# Patient Record
Sex: MALE | Race: WHITE | Employment: FULL TIME | URBAN - METROPOLITAN AREA
[De-identification: names, ages, dates, MRNs, and addresses within clinical notes are randomized per-mention and may not be internally consistent; named-entity substitution may affect disease eponyms.]

---

## 2019-06-04 ENCOUNTER — THERAPY VISIT (OUTPATIENT)
Dept: OCCUPATIONAL THERAPY | Facility: CLINIC | Age: 54
End: 2019-06-04
Payer: COMMERCIAL

## 2019-06-04 DIAGNOSIS — M25.642 STIFFNESS OF FINGER JOINT OF LEFT HAND: ICD-10-CM

## 2019-06-04 DIAGNOSIS — M20.012 MALLET DEFORMITY OF LEFT LITTLE FINGER: ICD-10-CM

## 2019-06-04 PROCEDURE — 97035 APP MDLTY 1+ULTRASOUND EA 15: CPT | Mod: GO | Performed by: OCCUPATIONAL THERAPIST

## 2019-06-04 PROCEDURE — 97165 OT EVAL LOW COMPLEX 30 MIN: CPT | Mod: GO | Performed by: OCCUPATIONAL THERAPIST

## 2019-06-04 PROCEDURE — 97110 THERAPEUTIC EXERCISES: CPT | Mod: GO | Performed by: OCCUPATIONAL THERAPIST

## 2019-06-04 NOTE — LETTER
[facility]  [address]  [address]  [facility] [address]  [phone]    2019    Re: Kulwinder Gurrola   :   1965  MRN:  [id number]   REFERRING PHYSICIAN:   Miguel Angel Rust    [facility]  Date of Initial Evaluation:  19  Visits:  Rxs Used: 1  Reason for Referral:     Mallet deformity of left little finger  Stiffness of finger joint of left hand    EVALUATION SUMMARY    Hand Therapy Initial Evaluation  Current Date:  2019  Referring Physician: Dr. Rust  Diagnosis: Left small finger mallet with PIP joint ash and contracture  DOI: 3/19/19    Subjective:  Kulwinder Gurrola is a 53 year old right hand dominant male.    Patient reports symptoms of pain, stiffness/loss of motion, weakness/loss of strength, edema, numbness and tingling  of the left small finger which occurred due to an injury sustained after a fall on the ice. Since onset symptoms are Gradually getting better.  Special tests:  x-ray.  Previous treatment: splinting for mallet 8 weeks.  Hand therapy.    General health as reported by patient is good.  Pertinent medical history includes:Numbness/Tingling, Osteoarthritis, Overweight  Medical allergies:none.  Surgical history: orthopedic: knee (17+), hip, neck, foot, other: hernia, eyelids.  Medication history: None.    Occupational Profile Information:  Current occupation is [employer]  Currently working in normal job without restrictions  Job Tasks: Computer Work, Prolonged Sitting  Prior functional level:  no limitations  Barriers include:none  Mobility: No difficulty  Transportation: drives  Leisure activities/hobbies: fishing, building a cabin    Upper Extremity Functional Index Score:  SCORE:   Column Totals: /80: 76   (A lower score indicates greater disability.)  Objective:  Pain Level (Scale 0-10):   2019   At Rest 0/10   With Use 1/10     Pain Description:  Date 2019   Location small finger   Pain Quality Sharp   Frequency intermittent      Pain is worst  daytime   Exacerbated by  gripping   Relieved by rest   Progression Gradually improved      ROM:    Small Finger 19   AROM(PROM) Right Left   MCP /85 0/35 pre  /65 post   PIP - - pre  /80 post   DIP +15/65 -15 pre  /50 post   FISHER       Strength:  Pain free (Measured in pounds)   19   Trials Right Left   1  2  3  Av  102  98  99 50  38  41  43     Edema:  []         None   [x]         Mild    []         Moderate      []         Severe                  Location:  Edema - Measured circumferentially in cm  Date 19         Right Left Right Left Right Left Right Left Right Left Right Left                   P1 6.9 7.2             PIP 6.5 6.8              P2 5.5 6.1             DIP                 Palpation:  []   Normal     [x]    Tender       [x]      Mild         []       Moderate []       Severe     Location: (L) SF PIP jt ulnar and radial side.    Sensation: []  WNL throughout all nerve distributions; per patient report    [x] Decreased  [x]    Median    []     Ulnar    []        Radial nerve distribution  Patient reports numbness on the right hand (thumb IF, MF, RF) from neck surgery    Assessment:  Patient presents with symptoms consistent with diagnosis of finger fracture,  with conservative intervention.     Patient's limitations or Problem List includes:  Pain, Decreased ROM/motion, Increased edema, Weakness, Decreased , Tightness in musculature and Adherence in connective tissue of the left small finger which interferes with the patient's ability to perform Work Tasks and Recreational Activities as compared to previous level of function.    Rehab Potential:  Excellent - Return to full activity, no limitations    Patient will benefit from skilled Occupational Therapy to increase ROM, flexibility, overall strength and  strength and decrease pain and edema to return to previous activity level and resume normal daily tasks and to reach their rehab  potential.    Barriers to Learning:  No barrier    Communication Issues:  Patient appears to be able to clearly communicate and understand verbal and written communication and follow directions correctly.    Chart Review: Chart Review, Brief history including review of medical and/or therapy records relating to the presenting problem and Simple history review with patient    Identified Performance Deficits: work and leisure activities    Assessment of Occupational Performance:  1-3 Performance Deficits    Clinical Decision Making (Complexity): Low complexity    Treatment Explanation:  The following has been discussed with the patient:  RX ordered/plan of care  Anticipated outcomes  Possible risks and side effects    Frequency:  1-2 X week, once daily  Duration:  for 12 visits  Treatment Plan:  Modalities:  US, Fluidotherapy and Paraffin  Therapeutic Exercise:  AROM, AAROM, PROM, Tendon Gliding, Blocking, Reverse Blocking, Extensor Tracking and Isotonics  Manual Techniques:  Joint mobilization and Manual edema mobilization  Orthotic Fabrication:  Static orthosis and Finger based orthosis  Discharge Plan:  Achieve all LTG.  Independent in home treatment program.  Reach maximal therapeutic benefit.    Home Exercise Program:  Tendon glides  BROM of PIP and DIP  PROM of finger flexion and extension  Static circumferential splint at night  Coban during the day    Next Visit:  Heat if effective  A/PROM of SF  Joint mobs  Edema mobilization    Thank you for your referral.    INQUIRIES  Therapist: LINDEN Brewster/L,CHT  [facility]  [address]  [address]  [facility] [address]  Phone: [phone]  Fax: [phone]

## 2019-06-04 NOTE — PROGRESS NOTES
Hand Therapy Initial Evaluation    Current Date:  2019  Referring Physician: Dr. Rust    Diagnosis: Left small finger mallet with PIP joint ash and contracture  DOI: 3/19/19    Subjective:  Kulwinder Gurrola is a 53 year old right hand dominant male.    Patient reports symptoms of pain, stiffness/loss of motion, weakness/loss of strength, edema, numbness and tingling  of the left small finger which occurred due to an injury sustained after a fall on the ice. Since onset symptoms are Gradually getting better.  Special tests:  x-ray.  Previous treatment: splinting for mallet 8 weeks.  Hand therapy.    General health as reported by patient is good.  Pertinent medical history includes:Numbness/Tingling, Osteoarthritis, Overweight  Medical allergies:none.  Surgical history: orthopedic: knee (17+), hip, neck, foot, other: hernia, eyelids.  Medication history: None.    Occupational Profile Information:  Current occupation is Cyber [employer]  Currently working in normal job without restrictions  Job Tasks: Computer Work, Prolonged Sitting  Prior functional level:  no limitations  Barriers include:none  Mobility: No difficulty  Transportation: drives  Leisure activities/hobbies: fishing, building a [employer]    Upper Extremity Functional Index Score:  SCORE:   Column Totals: /80: 76   (A lower score indicates greater disability.)    Objective:  Pain Level (Scale 0-10):   2019   At Rest 0/10   With Use 1/10     Pain Description:  Date 2019   Location small finger   Pain Quality Sharp   Frequency intermittent     Pain is worst  daytime   Exacerbated by  gripping   Relieved by rest   Progression Gradually improved      ROM:    Small Finger 19   AROM(PROM) Right Left   MCP /85 0/35 pre  /65 post   PIP -30/95 -30/65 pre  /80 post   DIP +15/65 -15/40 pre  /50 post   FISEHR       Strength:  Pain free (Measured in pounds)   19   Trials Right Left   1  2  3  Av  102  98  99 50  38  41  43     Edema:  []          None   [x]         Mild    []         Moderate      []         Severe                  Location:  Edema - Measured circumferentially in cm  Date 6/4/19         Right Left Right Left Right Left Right Left Right Left Right Left                   P1 6.9 7.2             PIP 6.5 6.8              P2 5.5 6.1             DIP                   Palpation:  []         Normal        [x]       Tender       [x]      Mild         []       Moderate []       Severe     Location: (L) SF PIP jt ulnar and radial side.    Sensation: []                   WNL throughout all nerve distributions; per patient report    [x]                   Decreased  [x]        Median    []        Ulnar    []        Radial nerve distribution  Patient reports numbness on the right hand (thumb IF, MF, RF) from neck surgery    Assessment:  Patient presents with symptoms consistent with diagnosis of finger fracture,  with conservative intervention.     Patient's limitations or Problem List includes:  Pain, Decreased ROM/motion, Increased edema, Weakness, Decreased , Tightness in musculature and Adherence in connective tissue of the left small finger which interferes with the patient's ability to perform Work Tasks and Recreational Activities as compared to previous level of function.    Rehab Potential:  Excellent - Return to full activity, no limitations    Patient will benefit from skilled Occupational Therapy to increase ROM, flexibility, overall strength and  strength and decrease pain and edema to return to previous activity level and resume normal daily tasks and to reach their rehab potential.    Barriers to Learning:  No barrier    Communication Issues:  Patient appears to be able to clearly communicate and understand verbal and written communication and follow directions correctly.    Chart Review: Chart Review, Brief history including review of medical and/or therapy records relating to the presenting problem and Simple history review  with patient    Identified Performance Deficits: work and leisure activities    Assessment of Occupational Performance:  1-3 Performance Deficits    Clinical Decision Making (Complexity): Low complexity    Treatment Explanation:  The following has been discussed with the patient:  RX ordered/plan of care  Anticipated outcomes  Possible risks and side effects    Frequency:  1-2 X week, once daily  Duration:  for 12 visits  Treatment Plan:  Modalities:  US, Fluidotherapy and Paraffin  Therapeutic Exercise:  AROM, AAROM, PROM, Tendon Gliding, Blocking, Reverse Blocking, Extensor Tracking and Isotonics  Manual Techniques:  Joint mobilization and Manual edema mobilization  Orthotic Fabrication:  Static orthosis and Finger based orthosis  Discharge Plan:  Achieve all LTG.  Independent in home treatment program.  Reach maximal therapeutic benefit.      Home Exercise Program:  Tendon glides  BROM of PIP and DIP  PROM of finger flexion and extension  Static circumferential splint at night  Coban during the day    Next Visit:  Heat if effective  A/PROM of SF  Joint mobs  Edema mobilization

## 2019-06-06 ENCOUNTER — THERAPY VISIT (OUTPATIENT)
Dept: OCCUPATIONAL THERAPY | Facility: CLINIC | Age: 54
End: 2019-06-06
Payer: COMMERCIAL

## 2019-06-06 DIAGNOSIS — M25.642 STIFFNESS OF FINGER JOINT OF LEFT HAND: ICD-10-CM

## 2019-06-06 DIAGNOSIS — M20.012 MALLET DEFORMITY OF LEFT LITTLE FINGER: ICD-10-CM

## 2019-06-06 PROCEDURE — 97140 MANUAL THERAPY 1/> REGIONS: CPT | Mod: GO | Performed by: OCCUPATIONAL THERAPIST

## 2019-06-06 PROCEDURE — 97035 APP MDLTY 1+ULTRASOUND EA 15: CPT | Mod: GO | Performed by: OCCUPATIONAL THERAPIST

## 2019-06-06 PROCEDURE — 97110 THERAPEUTIC EXERCISES: CPT | Mod: GO | Performed by: OCCUPATIONAL THERAPIST

## 2019-06-06 NOTE — PROGRESS NOTES
SOAP Note - Hand Therapy - Objective Information    Current Date:  2019  Referring Physician: Dr. Rust    Diagnosis: Left small finger mallet with PIP joint ash and contracture  DOI: 3/19/19    Kulwinder Gurrola is a 53 year old right hand dominant male.    Patient reports symptoms of pain, stiffness/loss of motion, weakness/loss of strength, edema, numbness and tingling  of the left small finger which occurred due to an injury sustained after a fall on the ice.     Occupational Profile Information:  Current occupation is Cyber Security    S:  Subjective changes as noted by patient: the swelling is down.  Functional changes noted by patient: no changes      O:  Pain Level (Scale 0-10):   2019   At Rest 0/10   With Use 10     Pain Description:  Date 2019   Location small finger   Pain Quality Sharp   Frequency intermittent     Pain is worst  daytime   Exacerbated by  gripping   Relieved by rest   Progression Gradually improved      ROM:    Small Finger 19   AROM(PROM) Right Left Left   MCP /85 0/35 pre  /65 post /80 pre  /85 post   PIP - -30 pre  /80 post - pre  /85 post   DIP +15/65 -15 pre  /50 post /30 pre  /45 post   FISHER        Strength:  Pain free (Measured in pounds)   19   Trials Right Left   1  2  3  Av  102  98  99 50  38  41  43     Edema:  []         None   [x]         Mild    []         Moderate      []         Severe                  Location:  Edema - Measured circumferentially in cm  Date 19        Right Left Right Left Right Left Right Left Right Left Right Left                   P1 6.9 7.2  6.9           PIP 6.5 6.8  6.6            P2 5.5 6.1  5.8           DIP                   Palpation:  []         Normal        [x]       Tender       [x]      Mild         []       Moderate []       Severe     Location: (L) SF PIP jt ulnar and radial side.    Sensation: []                   WNL throughout all nerve distributions; per patient  report    [x]                   Decreased  [x]        Median    []        Ulnar    []        Radial nerve distribution  Patient reports numbness on the right hand (thumb IF, MF, RF) from neck surgery    Please refer to the daily flowsheet for treatment provided today.   Home Exercise Program:  Tendon glides  BROM of PIP and DIP  PROM of finger flexion and extension  Static circumferential splint at night  Coban during the day    Next Visit:  Heat if effective  A/PROM of SF  Joint mobs  Edema mobilization

## 2019-06-18 ENCOUNTER — THERAPY VISIT (OUTPATIENT)
Dept: OCCUPATIONAL THERAPY | Facility: CLINIC | Age: 54
End: 2019-06-18
Payer: COMMERCIAL

## 2019-06-18 DIAGNOSIS — M20.012 MALLET DEFORMITY OF LEFT LITTLE FINGER: ICD-10-CM

## 2019-06-18 DIAGNOSIS — M25.642 STIFFNESS OF FINGER JOINT OF LEFT HAND: ICD-10-CM

## 2019-06-18 PROCEDURE — 97035 APP MDLTY 1+ULTRASOUND EA 15: CPT | Mod: GO | Performed by: OCCUPATIONAL THERAPIST

## 2019-06-18 PROCEDURE — 97110 THERAPEUTIC EXERCISES: CPT | Mod: GO | Performed by: OCCUPATIONAL THERAPIST

## 2019-06-18 PROCEDURE — 97140 MANUAL THERAPY 1/> REGIONS: CPT | Mod: GO | Performed by: OCCUPATIONAL THERAPIST

## 2019-06-18 NOTE — PROGRESS NOTES
SOAP Note - Hand Therapy - Objective Information    Current Date:  2019  Referring Physician: Dr. Rust    Diagnosis: Left small finger mallet with PIP joint ash and contracture  DOI: 3/19/19    uKlwinder Gurrola is a 53 year old right hand dominant male.    Patient reports symptoms of pain, stiffness/loss of motion, weakness/loss of strength, edema, numbness and tingling  of the left small finger which occurred due to an injury sustained after a fall on the ice.     Occupational Profile Information:  Current occupation is Cyber Security    S:  Subjective changes as noted by patient: The joint is still stiff but my movement is better  Functional changes noted by patient: able to type better with no pain      O:  Pain Level (Scale 0-10):   2019   At Rest 010 010   With Use 1/10 1/10     Pain Description:  Date 2019   Location small finger   Pain Quality Sharp   Frequency intermittent     Pain is worst  daytime   Exacerbated by  gripping   Relieved by rest   Progression Gradually improved      ROM:    Small Finger 19   AROM(PROM) Right Left Left Left   MCP /85 035 pre  /65 post /80 pre  /85 post /80 pre  /85   PIP - - pre  /80 post - pre  / post - pre  -   DIP +15/65 -15 pre  /50 post /30 pre  /45 post /45 pre  /55   FISHER         Strength:  Pain free (Measured in pounds)   19   Trials Right Left Left   1  2  3  Av  102  98  99 50  38  41  43 48  40  37  42       Edema:  []         None   [x]         Mild    []         Moderate      []         Severe                  Location:  Edema - Measured circumferentially in cm  Date 19 Left       Right Left Right Left Right Left Right Left Right Left Right Left                   P1 6.9 7.2  6.9  6.7         PIP 6.5 6.8  6.6  6.6          P2 5.5 6.1  5.8  5.8         DIP                   Palpation:  []         Normal        [x]       Tender       [x]      Mild          []       Moderate []       Severe     Location: (L) SF PIP jt ulnar and radial side.    Sensation: []                   WNL throughout all nerve distributions; per patient report    [x]                   Decreased  [x]        Median    []        Ulnar    []        Radial nerve distribution  Patient reports numbness on the right hand (thumb IF, MF, RF) from neck surgery    Please refer to the daily flowsheet for treatment provided today.   Home Exercise Program:  Tendon glides  BROM of PIP and DIP  PROM of finger flexion and extension  Static circumferential splint at night  Coban during the day  Putty pulls and pushes    Next Visit:  Heat if effective  A/PROM of SF  Joint mobs  Edema mobilization

## 2019-06-20 ENCOUNTER — THERAPY VISIT (OUTPATIENT)
Dept: OCCUPATIONAL THERAPY | Facility: CLINIC | Age: 54
End: 2019-06-20
Payer: COMMERCIAL

## 2019-06-20 DIAGNOSIS — M20.012 MALLET DEFORMITY OF LEFT LITTLE FINGER: ICD-10-CM

## 2019-06-20 DIAGNOSIS — M25.642 STIFFNESS OF FINGER JOINT OF LEFT HAND: ICD-10-CM

## 2019-06-20 PROCEDURE — 97035 APP MDLTY 1+ULTRASOUND EA 15: CPT | Mod: GO | Performed by: OCCUPATIONAL THERAPIST

## 2019-06-20 PROCEDURE — 97110 THERAPEUTIC EXERCISES: CPT | Mod: GO | Performed by: OCCUPATIONAL THERAPIST

## 2019-06-20 PROCEDURE — 97140 MANUAL THERAPY 1/> REGIONS: CPT | Mod: GO | Performed by: OCCUPATIONAL THERAPIST

## 2019-06-25 ENCOUNTER — THERAPY VISIT (OUTPATIENT)
Dept: OCCUPATIONAL THERAPY | Facility: CLINIC | Age: 54
End: 2019-06-25
Payer: COMMERCIAL

## 2019-06-25 DIAGNOSIS — M20.012 MALLET DEFORMITY OF LEFT LITTLE FINGER: ICD-10-CM

## 2019-06-25 DIAGNOSIS — M25.642 STIFFNESS OF FINGER JOINT OF LEFT HAND: ICD-10-CM

## 2019-06-25 PROCEDURE — 97140 MANUAL THERAPY 1/> REGIONS: CPT | Mod: GO | Performed by: OCCUPATIONAL THERAPIST

## 2019-06-25 PROCEDURE — 97110 THERAPEUTIC EXERCISES: CPT | Mod: GO | Performed by: OCCUPATIONAL THERAPIST

## 2019-06-25 PROCEDURE — 97035 APP MDLTY 1+ULTRASOUND EA 15: CPT | Mod: GO | Performed by: OCCUPATIONAL THERAPIST

## 2019-09-09 PROBLEM — M20.012 MALLET DEFORMITY OF LEFT LITTLE FINGER: Status: RESOLVED | Noted: 2019-06-04 | Resolved: 2019-09-09

## 2019-09-09 PROBLEM — M25.642 STIFFNESS OF FINGER JOINT OF LEFT HAND: Status: RESOLVED | Noted: 2019-06-04 | Resolved: 2019-09-09

## 2019-09-09 NOTE — PROGRESS NOTES
Discharge Summary - Hand Therapy    Patient did not return to therapy. Please refer to the last SOAP note for objective details and goal achievement.  We will assume that patient's goals were met.    D/C from [facility].